# Patient Record
Sex: FEMALE | Race: WHITE | NOT HISPANIC OR LATINO | Employment: FULL TIME | URBAN - METROPOLITAN AREA
[De-identification: names, ages, dates, MRNs, and addresses within clinical notes are randomized per-mention and may not be internally consistent; named-entity substitution may affect disease eponyms.]

---

## 2017-03-19 ENCOUNTER — HOSPITAL ENCOUNTER (EMERGENCY)
Facility: HOSPITAL | Age: 37
Discharge: HOME | End: 2017-03-19
Attending: EMERGENCY MEDICINE
Payer: COMMERCIAL

## 2017-03-19 VITALS
SYSTOLIC BLOOD PRESSURE: 117 MMHG | DIASTOLIC BLOOD PRESSURE: 76 MMHG | WEIGHT: 112 LBS | OXYGEN SATURATION: 99 % | TEMPERATURE: 98 F | BODY MASS INDEX: 19.12 KG/M2 | HEART RATE: 65 BPM | HEIGHT: 64 IN | RESPIRATION RATE: 16 BRPM

## 2017-03-19 DIAGNOSIS — L24.9 IRRITANT CONTACT DERMATITIS, UNSPECIFIED TRIGGER: Primary | ICD-10-CM

## 2017-03-19 PROCEDURE — 99283 EMERGENCY DEPT VISIT LOW MDM: CPT

## 2017-03-19 RX ORDER — PREDNISONE 20 MG/1
40 TABLET ORAL DAILY
Qty: 10 TABLET | Refills: 0 | Status: SHIPPED | OUTPATIENT
Start: 2017-03-19 | End: 2017-03-24

## 2017-03-19 NOTE — ED AVS SNAPSHOT
[facility] [address] [place] [address]               Lexy Guerra   3/19/2017  9:28 PM   ED    Description:  Female : 1980   Department:  [facility]           Your Care was Coordinated By:     Provider Role From To    Odell Red MD Attending Provider 17 [phone] --      Reason for Visit     Insect Bite           Diagnoses this Visit        Comments    Irritant contact dermatitis, unspecified trigger    -  Primary       ED Disposition     ED Disposition Condition Comment    Discharge             To Do List           Follow-up Information     Follow up with Sirisha Moe MD.    Specialty:  Family Medicine    Why:  As needed    Contact information:    [address]  [address]  [facility] [address] [phone]          Follow up with [facility].    Specialty:  Emergency Medicine    Why:  If symptoms worsen    Contact information:    [address] [place] [id number] [phone]      [facility]     [facility] Care [facility] -  Assistance  Registered nurses in the [facility] provide clinical advisement, health education, appointment booking, and other advisory services.  Call for this free service at [phone].             Medications           Message regarding Medications     Verify the changes and/or additions to your medication regime listed below are the same as discussed with your clinician today.  If any of these changes or additions are incorrect, please notify your healthcare provider.             Verify that the below list of medications is an accurate representation of the medications you are currently taking.  If none reported, the list may be blank. If incorrect, please contact your healthcare provider. Carry this list with you in case of emergency.                Clinical Reference Information           Your Vitals Were     BP  "Pulse Temp Resp Height Weight    117/76 65 98.3 °F (36.8 °C) (Oral) 16 5' 3.5" (1.613 m) 50.8 kg (112 lb)    Last Period SpO2 BMI          03/19/2017 99% 19.53 kg/m2        Allergies as of 3/19/2017     No Known Allergies      Immunizations Administered on Date of Encounter - 3/19/2017     None      ED Micro, Lab, POCT     None      ED Imaging Orders     None        Discharge Instructions         Understanding Contact Dermatitis     A cool, moist compress can help reduce itching.     Contact dermatitis is a common type of skin rash. Its caused by something that touches the skin and makes it irritated and inflamed. It can occur on skin on any part of the body, such as the face, neck, hands, arms, and legs. Contact dermatitis is not spread from person to person.  Often, the reaction of contact dermatitis occurs 1 to 2 days after contact with the offending agent.  How to say it  CARLOS-tact [id number]   What causes contact dermatitis?  Its caused by something that irritates the skin, or that creates an allergic reaction on the skin. People can get contact dermatitis from many kinds of things. These include:  · Plant oils in poison ivy, oak, and sumac  · Chemicals in household , solvents, and glue  · Chemicals in makeup, soap, laundry detergent, perfume, acne cream, and hair products  · Certain medicines, such as neomycin, bacitracin, benzocaine, and thimerosal  · Metals such as nickel, found in some jewelry and watch bands   · The sticky material on the back of bandages and tape (adhesive)  · Things that can cause tiny breaks in the skin, such as wood, fiberglass, metal tools, and plant thorns  · Rubber latex in surgical gloves and other medical supplies  Dermatitis can also be caused by the skin being damp for long periods of time. This can happen from washing your hands too often, or working with wet materials.  Symptoms of contact dermatitis  Symptoms can include skin that " is:  · Blistered  · Burning  · Cracked  · Dry  · Itchy  · Painful  · Red  · Rough, thickened, and leathery  · Swollen  · Warm  The blisters may ooze fluid and form crusts.  Treatment for contact dermatitis  Treatment is done to help relieve itching and reduce inflammation. The rash should go away in a few days to a few weeks. Treatments include:  · Cool, moist compress. Use a clean damp cloth. Put it on the area for 20 to 30 minutes, 5 to 6 times a day for the first 3 days.  · Steroid cream or ointment. You can apply this medicine several times a day on clean skin.  · Oral corticosteroid. Your healthcare provider may prescribe this medicine if you have severe skin symptoms on a large part of your body.  Your healthcare provider may give you a steroid injection instead of pills.  · Oral antihistamine. This medicine can help reduce itching.  · Colloidal oatmeal bath. Soaking in water with colloidal oatmeal can help soothe skin.  · Plain cream, lotion, or ointment. Cream, lotion, or ointment without medicine can help to soothe and protect your skin.  Living with contact dermatitis  Talk with your healthcare provider about what may have caused your contact dermatitis. Patch testing may help you figure out what caused the rash so you can avoid further contact with it. Once you learn what caused your rash, make sure to avoid that substance. If your skin comes into contact with it again, make sure to wash your skin right away. If you cant avoid the substance, wear gloves or other protective clothing before you touch it. Or use a cream, lotion, or ointment to protect your skin.  When to call your healthcare provider  Call your healthcare provider right away if you have any of these:  · Fever of 100.4°F (38°C) or higher, or as directed  · Symptoms that dont get better, or get worse  · New symptoms   Date Last Reviewed: 5/1/2016  © 4313-2101 [employer]. [address] [place] [address], [place] [address]. All rights  reserved. This information is not intended as a substitute for professional medical care. Always follow your healthcare professional's instructions.          [facility] Sign-Up     Activating your [facility] account is as easy as 1-2-3!     1) Visit [email or web address], select Sign Up Now, enter this activation code and your date of birth, then select Next.  [email or web address]  Expires: 5/3/2017 10:57 PM      2) Create a username and password to use when you visit [facility] in the future and select a security question in case you lose your password and select Next.    3) Enter your e-mail address and click Sign Up!    Additional Information  If you have questions, please e-mail [email or web address] or call [phone] to talk to our [facility] staff. Remember, [facility] is NOT to be used for urgent needs. For medical emergencies, dial 911.          [facility] complies with applicable Federal civil rights laws and does not discriminate on the basis of race, color, national origin, age, disability, or sex.        Language Assistance Services     ATTENTION: Language assistance services are available, free of charge. Please call [phone].      ATENCIÓN: Si habla ryan, tiene a pak disposición servicios gratuitos de asistencia lingüística. Llame al [phone].     CHÚ Ý: N?u b?n nói Ti?ng Vi?t, có các d?ch v? h? tr? ngôn ng? mi?n phí dành cho b?n. G?i s? [phone].

## 2017-03-20 NOTE — ED PROVIDER NOTES
Encounter Date: 3/19/2017    SCRIBE #1 NOTE: I, Jasmyn Wagner, am scribing for, and in the presence of, Dr Red.       History     Chief Complaint   Patient presents with    Insect Bite     About 11:00 this morning      Review of patient's allergies indicates:  No Known Allergies  HPI Comments: 03/19/2017  10:54 PM     Chief Complaint: Insect Bite      Lexy Guerra is a 36 y.o. female presenting to the E.D. with an acute onset of swelling to her left ring finger which occurred suddenly tonight. Pt notes possible spider bite to finger but denies pain or itching to site. She notes she was in woods earlier today helping family member cutting mando. She has taken Benadryl tonight which mildly alleviated symptoms. Denies SOB, other lesions. No past medical history on file.  Pt has no past surgical history on file.    The history is provided by the patient.     History reviewed. No pertinent past medical history.  History reviewed. No pertinent surgical history.  History reviewed. No pertinent family history.  Social History   Substance Use Topics    Smoking status: Never Smoker    Smokeless tobacco: None    Alcohol use None     Review of Systems   Constitutional: Negative for fever.   HENT: Negative for sore throat.    Eyes: Negative for visual disturbance.   Respiratory: Negative for cough.    Cardiovascular: Negative for chest pain.   Gastrointestinal: Negative for abdominal pain, diarrhea, nausea and vomiting.   Genitourinary: Negative for difficulty urinating and pelvic pain.   Musculoskeletal: Positive for joint swelling. Negative for arthralgias.        Left 4th finger swelling.   Skin: Negative for rash.   Neurological: Negative for weakness.       Physical Exam   Initial Vitals   BP Pulse Resp Temp SpO2   03/19/17 2122 03/19/17 2122 03/19/17 2122 03/19/17 2122 03/19/17 2122   117/76 65 16 98.3 °F (36.8 °C) 99 %     Physical Exam    Nursing note and vitals reviewed.  Constitutional: She appears  well-developed.   HENT:   Head: Normocephalic and atraumatic.   Mouth/Throat: Oropharynx is clear and moist.   Eyes: Conjunctivae are normal.   Neck: Neck supple.   Cardiovascular: Normal rate, regular rhythm, normal heart sounds and intact distal pulses. Exam reveals no gallop and no friction rub.    No murmur heard.  Pulmonary/Chest: Breath sounds normal. She has no wheezes. She has no rhonchi. She has no rales.   Abdominal: Soft. She exhibits no distension. There is no tenderness.   Musculoskeletal: Normal range of motion.   Neurological: She is alert and oriented to person, place, and time.   Skin: No rash noted. There is erythema.   Focal erythema over the middle portion of left ring finger. No warmth.    Psychiatric: She has a normal mood and affect.         ED Course   Procedures  Labs Reviewed - No data to display          Medical Decision Making:   History:   Old Medical Records: I decided to obtain old medical records.  Initial Assessment:   36-year-old woman presents for evaluation of rash on her finger which is painless.  Patient was exposed to various substances in nature, unsure as to what the insulting agent was.  She did notice some swelling of her ring finger and removed her ring and had a mildly erythematous rash.  No discharge or pustule formation.  No fever or mucous membrane involvement or any other symptoms.  Her symptoms have improved after taking Benadryl.  Suspect contact dermatitis.  We'll give her steroids and she is to continue taking Benadryl.  Return precautions discussed.  She is discharged improved in no acute distress.            Scribe Attestation:   Scribe #1: I performed the above scribed service and the documentation accurately describes the services I performed. I attest to the accuracy of the note.    Attending Attestation:           Physician Attestation for Scribe:  Physician Attestation Statement for Scribe #1: I, Dr Red, reviewed documentation, as scribed by Jasmyn Wagner  in my presence, and it is both accurate and complete.                 ED Course     Clinical Impression:   The encounter diagnosis was Irritant contact dermatitis, unspecified trigger.          Odell Red MD  03/20/17 0044

## 2017-03-20 NOTE — DISCHARGE INSTRUCTIONS
Understanding Contact Dermatitis     A cool, moist compress can help reduce itching.     Contact dermatitis is a common type of skin rash. Its caused by something that touches the skin and makes it irritated and inflamed. It can occur on skin on any part of the body, such as the face, neck, hands, arms, and legs. Contact dermatitis is not spread from person to person.  Often, the reaction of contact dermatitis occurs 1 to 2 days after contact with the offending agent.  How to say it  CARLOS-tact [id number]   What causes contact dermatitis?  Its caused by something that irritates the skin, or that creates an allergic reaction on the skin. People can get contact dermatitis from many kinds of things. These include:  · Plant oils in poison ivy, oak, and sumac  · Chemicals in household , solvents, and glue  · Chemicals in makeup, soap, laundry detergent, perfume, acne cream, and hair products  · Certain medicines, such as neomycin, bacitracin, benzocaine, and thimerosal  · Metals such as nickel, found in some jewelry and watch bands   · The sticky material on the back of bandages and tape (adhesive)  · Things that can cause tiny breaks in the skin, such as wood, fiberglass, metal tools, and plant thorns  · Rubber latex in surgical gloves and other medical supplies  Dermatitis can also be caused by the skin being damp for long periods of time. This can happen from washing your hands too often, or working with wet materials.  Symptoms of contact dermatitis  Symptoms can include skin that is:  · Blistered  · Burning  · Cracked  · Dry  · Itchy  · Painful  · Red  · Rough, thickened, and leathery  · Swollen  · Warm  The blisters may ooze fluid and form crusts.  Treatment for contact dermatitis  Treatment is done to help relieve itching and reduce inflammation. The rash should go away in a few days to a few weeks. Treatments include:  · Cool, moist compress. Use a clean damp cloth. Put it on the area for 20 to 30  minutes, 5 to 6 times a day for the first 3 days.  · Steroid cream or ointment. You can apply this medicine several times a day on clean skin.  · Oral corticosteroid. Your healthcare provider may prescribe this medicine if you have severe skin symptoms on a large part of your body.  Your healthcare provider may give you a steroid injection instead of pills.  · Oral antihistamine. This medicine can help reduce itching.  · Colloidal oatmeal bath. Soaking in water with colloidal oatmeal can help soothe skin.  · Plain cream, lotion, or ointment. Cream, lotion, or ointment without medicine can help to soothe and protect your skin.  Living with contact dermatitis  Talk with your healthcare provider about what may have caused your contact dermatitis. Patch testing may help you figure out what caused the rash so you can avoid further contact with it. Once you learn what caused your rash, make sure to avoid that substance. If your skin comes into contact with it again, make sure to wash your skin right away. If you cant avoid the substance, wear gloves or other protective clothing before you touch it. Or use a cream, lotion, or ointment to protect your skin.  When to call your healthcare provider  Call your healthcare provider right away if you have any of these:  · Fever of 100.4°F (38°C) or higher, or as directed  · Symptoms that dont get better, or get worse  · New symptoms   Date Last Reviewed: 5/1/2016  © 9161-3232 [employer]. [address] [place] [address], [place] [address]. All rights reserved. This information is not intended as a substitute for professional medical care. Always follow your healthcare professional's instructions.

## 2018-02-02 ENCOUNTER — OFFICE VISIT (OUTPATIENT)
Dept: OBSTETRICS AND GYNECOLOGY | Facility: CLINIC | Age: 38
End: 2018-02-02
Payer: COMMERCIAL

## 2018-02-02 VITALS
HEIGHT: 64 IN | DIASTOLIC BLOOD PRESSURE: 64 MMHG | WEIGHT: 108.25 LBS | SYSTOLIC BLOOD PRESSURE: 94 MMHG | BODY MASS INDEX: 18.48 KG/M2 | HEART RATE: 85 BPM

## 2018-02-02 DIAGNOSIS — Z01.419 ENCOUNTER FOR WELL WOMAN EXAM WITH ROUTINE GYNECOLOGICAL EXAM: Primary | ICD-10-CM

## 2018-02-02 PROCEDURE — 88175 CYTOPATH C/V AUTO FLUID REDO: CPT

## 2018-02-02 PROCEDURE — 99385 PREV VISIT NEW AGE 18-39: CPT | Mod: S$GLB,,, | Performed by: NURSE PRACTITIONER

## 2018-02-02 PROCEDURE — 99999 PR PBB SHADOW E&M-EST. PATIENT-LVL III: CPT | Mod: PBBFAC,,, | Performed by: NURSE PRACTITIONER

## 2018-02-02 NOTE — PROGRESS NOTES
Chief Complaint   Patient presents with    Well Woman       History of Present Illness: Lexy Guerra is a 37 y.o. female that presents today 2018 for well gyn visit.  Pt here for well woman exam. Pt denies any abnormal pap smears in the past. She reports that she has once monthly cycles with no complaints. She is not on birth control and does not desire to be on any. She also does not desire any STD testing. No other complaints or concerns noted.          History reviewed. No pertinent past medical history.    History reviewed. No pertinent surgical history.    Family History   Problem Relation Age of Onset    Colon polyps Father     Diabetes Neg Hx     Hypertension Neg Hx     Breast cancer Neg Hx     Cancer Neg Hx     Colon cancer Neg Hx     Ovarian cancer Neg Hx        Social History     Social History    Marital status:      Spouse name: N/A    Number of children: N/A    Years of education: N/A     Social History Main Topics    Smoking status: Never Smoker    Smokeless tobacco: Never Used    Alcohol use No    Drug use: No    Sexual activity: Yes     Partners: Male     Birth control/ protection: None, Partner-Vasectomy     Other Topics Concern    None     Social History Narrative    None       OB History    Para Term  AB Living   3 0 0 0 3 0   SAB TAB Ectopic Multiple Live Births   3 0 0 0 0      # Outcome Date GA Lbr Wilver/2nd Weight Sex Delivery Anes PTL Lv   3 SAB            2 SAB            1 SAB                   No current outpatient prescriptions on file.     No current facility-administered medications for this visit.        Review of patient's allergies indicates:  No Known Allergies    Review of Symptoms:  GENERAL: Denies weight gain or weight loss. Feeling well overall.   SKIN: Denies rash or lesions.   ABDOMEN: No abdominal pain, constipation, diarrhea, nausea, vomiting or rectal bleeding.   URINARY: No frequency, dysuria, hematuria, or burning on  "urination.    BP 94/64   Pulse 85   Ht 5' 3.5" (1.613 m)   Wt 49.1 kg (108 lb 3.9 oz)   LMP 01/22/2018 (Approximate)     Physical Exam:  APPEARANCE: Well nourished, well developed, in no acute distress.  SKIN: Normal skin turgor, no lesions.  RESPIRATORY: Normal respiratory effort with no retractions or use of accessory muscles  ABDOMEN: Soft. No tenderness or masses. No hepatosplenomegaly. No hernias.  BREASTS: Symmetrical, no skin changes or visible lesions. No palpable masses, nipple discharge or adenopathy bilaterally.  PELVIC: Normal external female genitalia without lesions. Normal hair distribution. Adequate perineal body, normal urethral meatus. Urethra with no masses.  Bladder nontender. Vagina moist and well rugated without lesions or discharge. Cervix pink and without lesions. No significant cystocele or rectocele. Bimanual exam showed uterus normal size, shape, position, mobile and nontender. Adnexa without masses or tenderness. Urethra and bladder normal. PAP DONE      ASSESSMENT/PLAN:  Encounter for well woman exam with routine gynecological exam  -     Liquid-based pap smear, screening          Patient was counseled today on Pap guidelines, recommendation for pelvic exams, mammograms starting annually at age 40, Colonoscopy after the age of 50, Dexa Bone Scan and calcium and vitamin D supplementation in menopause and to see her PCP for other health maintenance.       Follow-up:  RTC in 1 year for well woman exam or as needed  "

## 2018-10-08 ENCOUNTER — HOSPITAL ENCOUNTER (OUTPATIENT)
Dept: RADIOLOGY | Facility: HOSPITAL | Age: 38
Discharge: HOME | End: 2018-10-08
Attending: OTOLARYNGOLOGY
Payer: COMMERCIAL

## 2018-10-08 DIAGNOSIS — J32.4 CHRONIC PANSINUSITIS: ICD-10-CM

## 2018-10-08 DIAGNOSIS — J32.4 CHRONIC PANSINUSITIS: Primary | ICD-10-CM

## 2018-10-08 PROCEDURE — 70486 CT MAXILLOFACIAL W/O DYE: CPT | Mod: TC

## 2018-10-08 PROCEDURE — 70486 CT MAXILLOFACIAL W/O DYE: CPT | Mod: 26,,, | Performed by: RADIOLOGY

## 2018-10-09 ENCOUNTER — LAB VISIT (OUTPATIENT)
Dept: LAB | Facility: HOSPITAL | Age: 38
End: 2018-10-09
Attending: OTOLARYNGOLOGY
Payer: COMMERCIAL

## 2018-10-09 DIAGNOSIS — J32.8 CHRONIC FRONTOETHMOIDAL SINUSITIS: Primary | ICD-10-CM

## 2018-10-09 PROCEDURE — 87107 FUNGI IDENTIFICATION MOLD: CPT

## 2018-10-09 PROCEDURE — 87070 CULTURE OTHR SPECIMN AEROBIC: CPT

## 2018-10-09 PROCEDURE — 87102 FUNGUS ISOLATION CULTURE: CPT

## 2018-10-12 LAB — BACTERIA NPH AEROBE CULT: NORMAL

## 2018-10-17 ENCOUNTER — LAB VISIT (OUTPATIENT)
Dept: LAB | Facility: HOSPITAL | Age: 38
End: 2018-10-17
Attending: OTOLARYNGOLOGY
Payer: COMMERCIAL

## 2018-10-17 DIAGNOSIS — J32.0 CHRONIC MAXILLARY SINUSITIS: Primary | ICD-10-CM

## 2018-10-17 DIAGNOSIS — J32.2 CHRONIC ETHMOIDAL SINUSITIS: ICD-10-CM

## 2018-10-17 LAB
B-HCG UR QL: NEGATIVE
ERYTHROCYTE [DISTWIDTH] IN BLOOD BY AUTOMATED COUNT: 13 %
HCT VFR BLD AUTO: 38.9 %
HGB BLD-MCNC: 12.7 G/DL
MCH RBC QN AUTO: 30.1 PG
MCHC RBC AUTO-ENTMCNC: 32.6 G/DL
MCV RBC AUTO: 92 FL
PLATELET # BLD AUTO: 271 K/UL
PMV BLD AUTO: 9.5 FL
RBC # BLD AUTO: 4.22 M/UL
WBC # BLD AUTO: 6.46 K/UL

## 2018-10-17 PROCEDURE — 36415 COLL VENOUS BLD VENIPUNCTURE: CPT

## 2018-10-17 PROCEDURE — 85027 COMPLETE CBC AUTOMATED: CPT

## 2018-10-17 PROCEDURE — 81025 URINE PREGNANCY TEST: CPT

## 2018-10-18 ENCOUNTER — ANESTHESIA EVENT (OUTPATIENT)
Dept: SURGERY | Facility: HOSPITAL | Age: 38
End: 2018-10-18
Payer: COMMERCIAL

## 2018-10-18 ENCOUNTER — HOSPITAL ENCOUNTER (OUTPATIENT)
Facility: HOSPITAL | Age: 38
Discharge: HOME | End: 2018-10-18
Attending: OTOLARYNGOLOGY | Admitting: OTOLARYNGOLOGY
Payer: COMMERCIAL

## 2018-10-18 ENCOUNTER — ANESTHESIA (OUTPATIENT)
Dept: SURGERY | Facility: HOSPITAL | Age: 38
End: 2018-10-18
Payer: COMMERCIAL

## 2018-10-18 VITALS
TEMPERATURE: 98 F | SYSTOLIC BLOOD PRESSURE: 123 MMHG | HEART RATE: 62 BPM | DIASTOLIC BLOOD PRESSURE: 87 MMHG | WEIGHT: 108 LBS | OXYGEN SATURATION: 100 % | RESPIRATION RATE: 20 BRPM | BODY MASS INDEX: 19.14 KG/M2 | HEIGHT: 63 IN

## 2018-10-18 DIAGNOSIS — J32.0 ANTRITIS CHRONIC: ICD-10-CM

## 2018-10-18 PROCEDURE — 25000003 PHARM REV CODE 250: Performed by: ANESTHESIOLOGY

## 2018-10-18 PROCEDURE — D9220A PRA ANESTHESIA: Mod: CRNA,,, | Performed by: NURSE ANESTHETIST, CERTIFIED REGISTERED

## 2018-10-18 PROCEDURE — 88312 SPECIAL STAINS GROUP 1: CPT | Performed by: PATHOLOGY

## 2018-10-18 PROCEDURE — 71000015 HC POSTOP RECOV 1ST HR: Performed by: OTOLARYNGOLOGY

## 2018-10-18 PROCEDURE — 63600175 PHARM REV CODE 636 W HCPCS: Performed by: OTOLARYNGOLOGY

## 2018-10-18 PROCEDURE — 88305 TISSUE EXAM BY PATHOLOGIST: CPT | Mod: 26,,, | Performed by: PATHOLOGY

## 2018-10-18 PROCEDURE — 88305 TISSUE EXAM BY PATHOLOGIST: CPT | Performed by: PATHOLOGY

## 2018-10-18 PROCEDURE — 71000033 HC RECOVERY, INTIAL HOUR: Performed by: OTOLARYNGOLOGY

## 2018-10-18 PROCEDURE — 37000009 HC ANESTHESIA EA ADD 15 MINS: Performed by: OTOLARYNGOLOGY

## 2018-10-18 PROCEDURE — 63600175 PHARM REV CODE 636 W HCPCS: Performed by: ANESTHESIOLOGY

## 2018-10-18 PROCEDURE — 25000003 PHARM REV CODE 250: Performed by: OTOLARYNGOLOGY

## 2018-10-18 PROCEDURE — 25000003 PHARM REV CODE 250: Performed by: NURSE ANESTHETIST, CERTIFIED REGISTERED

## 2018-10-18 PROCEDURE — C1726 CATH, BAL DIL, NON-VASCULAR: HCPCS | Performed by: OTOLARYNGOLOGY

## 2018-10-18 PROCEDURE — 63600175 PHARM REV CODE 636 W HCPCS

## 2018-10-18 PROCEDURE — 36000708 HC OR TIME LEV III 1ST 15 MIN: Performed by: OTOLARYNGOLOGY

## 2018-10-18 PROCEDURE — 37000008 HC ANESTHESIA 1ST 15 MINUTES: Performed by: OTOLARYNGOLOGY

## 2018-10-18 PROCEDURE — 36000709 HC OR TIME LEV III EA ADD 15 MIN: Performed by: OTOLARYNGOLOGY

## 2018-10-18 PROCEDURE — 27201423 OPTIME MED/SURG SUP & DEVICES STERILE SUPPLY: Performed by: OTOLARYNGOLOGY

## 2018-10-18 PROCEDURE — D9220A PRA ANESTHESIA: Mod: ANES,,, | Performed by: ANESTHESIOLOGY

## 2018-10-18 PROCEDURE — 87070 CULTURE OTHR SPECIMN AEROBIC: CPT

## 2018-10-18 PROCEDURE — 63600175 PHARM REV CODE 636 W HCPCS: Performed by: NURSE ANESTHETIST, CERTIFIED REGISTERED

## 2018-10-18 PROCEDURE — 71000039 HC RECOVERY, EACH ADD'L HOUR: Performed by: OTOLARYNGOLOGY

## 2018-10-18 PROCEDURE — 88312 SPECIAL STAINS GROUP 1: CPT | Mod: 26,,, | Performed by: PATHOLOGY

## 2018-10-18 PROCEDURE — 25000242 PHARM REV CODE 250 ALT 637 W/ HCPCS: Performed by: OTOLARYNGOLOGY

## 2018-10-18 RX ORDER — BUDESONIDE 0.25 MG/2ML
INHALANT ORAL
Status: DISCONTINUED | OUTPATIENT
Start: 2018-10-18 | End: 2018-10-18 | Stop reason: HOSPADM

## 2018-10-18 RX ORDER — PROPOFOL 10 MG/ML
VIAL (ML) INTRAVENOUS
Status: DISCONTINUED | OUTPATIENT
Start: 2018-10-18 | End: 2018-10-18

## 2018-10-18 RX ORDER — SODIUM CHLORIDE, SODIUM LACTATE, POTASSIUM CHLORIDE, CALCIUM CHLORIDE 600; 310; 30; 20 MG/100ML; MG/100ML; MG/100ML; MG/100ML
125 INJECTION, SOLUTION INTRAVENOUS CONTINUOUS
Status: DISCONTINUED | OUTPATIENT
Start: 2018-10-18 | End: 2018-10-18 | Stop reason: HOSPADM

## 2018-10-18 RX ORDER — MORPHINE SULFATE 4 MG/ML
INJECTION, SOLUTION INTRAMUSCULAR; INTRAVENOUS
Status: DISCONTINUED
Start: 2018-10-18 | End: 2018-10-18 | Stop reason: HOSPADM

## 2018-10-18 RX ORDER — OXYMETAZOLINE HCL 0.05 %
SPRAY, NON-AEROSOL (ML) NASAL
Status: DISCONTINUED | OUTPATIENT
Start: 2018-10-18 | End: 2018-10-18 | Stop reason: HOSPADM

## 2018-10-18 RX ORDER — OXYMETAZOLINE HCL 0.05 %
3 SPRAY, NON-AEROSOL (ML) NASAL
Status: COMPLETED | OUTPATIENT
Start: 2018-10-18 | End: 2018-10-18

## 2018-10-18 RX ORDER — ONDANSETRON 2 MG/ML
INJECTION INTRAMUSCULAR; INTRAVENOUS
Status: DISCONTINUED | OUTPATIENT
Start: 2018-10-18 | End: 2018-10-18

## 2018-10-18 RX ORDER — SODIUM CHLORIDE, SODIUM LACTATE, POTASSIUM CHLORIDE, CALCIUM CHLORIDE 600; 310; 30; 20 MG/100ML; MG/100ML; MG/100ML; MG/100ML
INJECTION, SOLUTION INTRAVENOUS CONTINUOUS
Status: DISCONTINUED | OUTPATIENT
Start: 2018-10-18 | End: 2018-10-18 | Stop reason: HOSPADM

## 2018-10-18 RX ORDER — MEPERIDINE HYDROCHLORIDE 50 MG/ML
INJECTION INTRAMUSCULAR; INTRAVENOUS; SUBCUTANEOUS
Status: DISCONTINUED | OUTPATIENT
Start: 2018-10-18 | End: 2018-10-18

## 2018-10-18 RX ORDER — FLUCONAZOLE 100 MG/1
TABLET ORAL
Status: DISCONTINUED | OUTPATIENT
Start: 2018-10-18 | End: 2018-10-18 | Stop reason: HOSPADM

## 2018-10-18 RX ORDER — GLYCOPYRROLATE 0.2 MG/ML
INJECTION INTRAMUSCULAR; INTRAVENOUS
Status: DISCONTINUED | OUTPATIENT
Start: 2018-10-18 | End: 2018-10-18

## 2018-10-18 RX ORDER — LIDOCAINE HYDROCHLORIDE AND EPINEPHRINE 10; 10 MG/ML; UG/ML
INJECTION, SOLUTION INFILTRATION; PERINEURAL
Status: DISCONTINUED | OUTPATIENT
Start: 2018-10-18 | End: 2018-10-18 | Stop reason: HOSPADM

## 2018-10-18 RX ORDER — ONDANSETRON 2 MG/ML
4 INJECTION INTRAMUSCULAR; INTRAVENOUS DAILY PRN
Status: DISCONTINUED | OUTPATIENT
Start: 2018-10-18 | End: 2018-10-18 | Stop reason: HOSPADM

## 2018-10-18 RX ORDER — SUCCINYLCHOLINE CHLORIDE 20 MG/ML
INJECTION INTRAMUSCULAR; INTRAVENOUS
Status: DISCONTINUED | OUTPATIENT
Start: 2018-10-18 | End: 2018-10-18

## 2018-10-18 RX ORDER — DEXAMETHASONE SODIUM PHOSPHATE 4 MG/ML
INJECTION, SOLUTION INTRA-ARTICULAR; INTRALESIONAL; INTRAMUSCULAR; INTRAVENOUS; SOFT TISSUE
Status: DISCONTINUED | OUTPATIENT
Start: 2018-10-18 | End: 2018-10-18

## 2018-10-18 RX ORDER — CEFAZOLIN SODIUM 1 G/3ML
INJECTION, POWDER, FOR SOLUTION INTRAMUSCULAR; INTRAVENOUS
Status: DISCONTINUED | OUTPATIENT
Start: 2018-10-18 | End: 2018-10-18

## 2018-10-18 RX ORDER — ONDANSETRON 2 MG/ML
INJECTION INTRAMUSCULAR; INTRAVENOUS
Status: COMPLETED
Start: 2018-10-18 | End: 2018-10-18

## 2018-10-18 RX ORDER — MIDAZOLAM HYDROCHLORIDE 1 MG/ML
INJECTION, SOLUTION INTRAMUSCULAR; INTRAVENOUS
Status: DISCONTINUED | OUTPATIENT
Start: 2018-10-18 | End: 2018-10-18

## 2018-10-18 RX ORDER — MORPHINE SULFATE 2 MG/ML
2 INJECTION, SOLUTION INTRAMUSCULAR; INTRAVENOUS EVERY 5 MIN PRN
Status: DISCONTINUED | OUTPATIENT
Start: 2018-10-18 | End: 2018-10-18 | Stop reason: HOSPADM

## 2018-10-18 RX ORDER — LIDOCAINE HYDROCHLORIDE 10 MG/ML
1 INJECTION, SOLUTION EPIDURAL; INFILTRATION; INTRACAUDAL; PERINEURAL ONCE
Status: DISCONTINUED | OUTPATIENT
Start: 2018-10-18 | End: 2018-10-18 | Stop reason: HOSPADM

## 2018-10-18 RX ORDER — CEFAZOLIN SODIUM 1 G/3ML
INJECTION, POWDER, FOR SOLUTION INTRAMUSCULAR; INTRAVENOUS
Status: COMPLETED
Start: 2018-10-18 | End: 2018-10-18

## 2018-10-18 RX ADMIN — SODIUM CHLORIDE, POTASSIUM CHLORIDE, SODIUM LACTATE AND CALCIUM CHLORIDE: 600; 310; 30; 20 INJECTION, SOLUTION INTRAVENOUS at 12:10

## 2018-10-18 RX ADMIN — MORPHINE SULFATE 2 MG: 2 INJECTION, SOLUTION INTRAMUSCULAR; INTRAVENOUS at 02:10

## 2018-10-18 RX ADMIN — ONDANSETRON 4 MG: 2 INJECTION INTRAMUSCULAR; INTRAVENOUS at 01:10

## 2018-10-18 RX ADMIN — ONDANSETRON HYDROCHLORIDE 4 MG: 2 INJECTION INTRAMUSCULAR; INTRAVENOUS at 02:10

## 2018-10-18 RX ADMIN — MEPERIDINE HYDROCHLORIDE 25 MG: 50 INJECTION INTRAMUSCULAR; INTRAVENOUS; SUBCUTANEOUS at 01:10

## 2018-10-18 RX ADMIN — OXYMETAZOLINE HYDROCHLORIDE 3 SPRAY: 5 SPRAY NASAL at 11:10

## 2018-10-18 RX ADMIN — ONDANSETRON 4 MG: 2 INJECTION INTRAMUSCULAR; INTRAVENOUS at 02:10

## 2018-10-18 RX ADMIN — MEPERIDINE HYDROCHLORIDE 25 MG: 50 INJECTION INTRAMUSCULAR; INTRAVENOUS; SUBCUTANEOUS at 12:10

## 2018-10-18 RX ADMIN — DEXAMETHASONE SODIUM PHOSPHATE 12 MG: 4 INJECTION, SOLUTION INTRAMUSCULAR; INTRAVENOUS at 12:10

## 2018-10-18 RX ADMIN — GLYCOPYRROLATE 0.4 MG: 0.2 INJECTION INTRAMUSCULAR; INTRAVENOUS at 12:10

## 2018-10-18 RX ADMIN — MIDAZOLAM HYDROCHLORIDE 2 MG: 1 INJECTION, SOLUTION INTRAMUSCULAR; INTRAVENOUS at 12:10

## 2018-10-18 RX ADMIN — CEFAZOLIN 1 G: 330 INJECTION, POWDER, FOR SOLUTION INTRAMUSCULAR; INTRAVENOUS at 12:10

## 2018-10-18 RX ADMIN — SUCCINYLCHOLINE CHLORIDE 100 MG: 20 INJECTION, SOLUTION INTRAMUSCULAR; INTRAVENOUS at 12:10

## 2018-10-18 RX ADMIN — PROPOFOL 130 MG: 10 INJECTION, EMULSION INTRAVENOUS at 12:10

## 2018-10-18 NOTE — DISCHARGE SUMMARY
[facility] - Periop Services    Discharge Note        SUMMARY     Admit Date: 10/18/2018    Attending Physician: Torin Carver MD     Discharge Physician: Torin Carver MD    Discharge Date: 10/18/2018 2:03 PM      Hospital Course: Patient tolerated procedure well.     Disposition: Home or Self Care    Patient Instructions:   There are no discharge medications for this patient.      Discharge Procedure Orders (must include Diet, Follow-up, Activity):  No discharge procedures on file.     Follow Up:  Follow up as scheduled.  Resume routine diet.  Activity as tolerated.

## 2018-10-18 NOTE — ANESTHESIA PREPROCEDURE EVALUATION
10/18/2018  Lexy Guerra is a 38 y.o., female.    Pre-op Assessment    I have reviewed the Patient Summary Reports.    I have reviewed the Nursing Notes.   I have reviewed the Medications.     Review of Systems  Anesthesia Hx:  No previous Anesthesia  Neg history of prior surgery. Denies Family Hx of Anesthesia complications.   Denies Personal Hx of Anesthesia complications.   Social:  Non-Smoker    Hematology/Oncology:  Hematology Normal   Oncology Normal     EENT/Dental:EENT/Dental Normal   Cardiovascular:  Cardiovascular Normal     Pulmonary:  Pulmonary Normal    Renal/:  Renal/ Normal     Hepatic/GI:  Hepatic/GI Normal    Musculoskeletal:  Musculoskeletal Normal    Neurological:  Neurology Normal    Endocrine:  Endocrine Normal    Dermatological:  Skin Normal    Psych:  Psychiatric Normal           Physical Exam  General:  Well nourished    Airway/Jaw/Neck:  AIRWAY FINDINGS: Normal      Eyes/Ears/Nose:  EYES/EARS/NOSE FINDINGS: Normal   Dental:  DENTAL FINDINGS: Normal   Chest/Lungs:  Chest/Lungs Clear    Heart/Vascular:  Heart Findings: Normal Heart murmur: negative Vascular Findings: Normal    Abdomen:  Abdomen Findings: Normal    Musculoskeletal:  Musculoskeletal Findings: Normal   Skin:  Skin Findings: Normal         Anesthesia Plan  Type of Anesthesia, risks & benefits discussed:  Anesthesia Type:  general  Patient's Preference:   Intra-op Monitoring Plan: standard ASA monitors  Intra-op Monitoring Plan Comments:   Post Op Pain Control Plan:   Post Op Pain Control Plan Comments:   Induction:   IV  Beta Blocker:  Patient is not currently on a Beta-Blocker (No further documentation required).       Informed Consent: Patient understands risks and agrees with Anesthesia plan.  Questions answered. Anesthesia consent signed with patient.  ASA Score: 1     Day of Surgery Review of History &  Physical:    H&P update referred to the provider.

## 2018-10-18 NOTE — OP NOTE
DATE OF PROCEDURE:  10/18/2018.    ANESTHESIA:  General endotracheal.    SURGEON:  Torin Carver Jr, MD.    PREOPERATIVE DIAGNOSES:  1. Chronic fungal sinusitis.  2. Bilateral inferior turbinate hypertrophy.  3. Nasal airway obstruction secondary to #2.     POSTOPERATIVE DIAGNOSES:  1. Chronic fungal sinusitis.  2. Bilateral inferior turbinate hypertrophy.  3. Nasal airway obstruction secondary to #2.     PROCEDURES PERFORMED:  1. Bilateral endoscopic anterior and posterior ethmoidectomy.  2. Bilateral endoscopic maxillary sinus antrostomy.  3. Bilateral endoscopic sphenoidotomy.  4. Bilateral endoscopic frontal duct exploration.  5. Bilateral incisional submucous resection of inferior turbinates.     The patient was taken to the operating room and placed in the supine  position. An IV was placed into the patient's arm. After satisfactory  general anesthesia had been obtained, the procedure was begun. Four  pledgets of 4 mL of 4% cocaine were placed into the patient's nose. The  patient's nose was injected with 1% lidocaine with 1:100,000 epinephrine;  approximately 15 mL was used. The patient was prepped and draped in the  standard fashion for a nasal operation.     Under endoscopic vision, the Sophie was walked down the lateral nasal wall  of the right nostril until it fell into the anterior ethmoid cells. The  anterior ethmoid cells were exenterated back to the lamina ethmoidalis. The  lamina ethmoidalis was breached, and the posterior ethmoid cells  exenterated back to the rostrum of the sphenoid. Under endoscopic vision  the sphenoid ostium was viewed and palpated and opened to approximately 5  mm in diameter. The 30 degree scope was taken and the maxillary sinus  viewed and palpated and the ostium opened to approximately 1 cm in  diameter. With a 70 degree scope, the nasofrontal area of the ethmoid  sinuses was viewed and, under endoscopic guidance, the nasofrontal duct was  opened so that it emptied widely  into the nose. This region was then packed  with 2 cocaine pledgets and attention turned to the left side.     Under endoscopic vision, the [place] was walked down the lateral nasal wall  of the left nostril until it fell into the anterior ethmoid cells. The  anterior ethmoid cells were exenterated back to the lamina ethmoidalis. The  lamina ethmoidalis was breached and the posterior ethmoid cells exenterated  back to the rostrum of the sphenoid. The 0 degree scope was taken and the  sphenoid ostium viewed and palpated. This was opened to approximately 5 mm  in diameter. The 30 degree scope was taken and the maxillary sinus ostium  viewed and palpated and opened to approximately 1 cm in diameter. With a 70  degree scope, the nasofrontal area of the ethmoid sinuses was viewed and,  under endoscopic guidance, the nasofrontal duct was opened so that it  emptied widely into the nose. The left airway was then packed with cocaine  pledgets.     Next, the inferior turbinates bilaterally were injected with lidocaine 1%  with epinephrine 1:100,000; approximately 10 mL was used. Then 4 mL of 4%  cocaine was placed on 4 pledgets and placed into the patient's nose. The  cocaine pledgets were removed and the procedure was begun. Attention was  turned to the right inferior turbinate. An incision was made along the  inferior aspect of the right inferior turbinate from posterior to anterior.  A medial flap was elevated with the Sophie. The turbinate bone and lateral  turbinate mucosa were excised over the anterior one half of the turbinate  and the medial flap laid back over the operative bed. Attention was then  turned to the left inferior turbinate. An incision was made from posterior  to anterior along the inferior surface of the inferior turbinate. A medial  flap was elevated. The turbinate bone and the lateral turbinate mucosa were  removed over the anterior one half of the inferior turbinate and the medial  flap laid back over  the operative bed.        ANUSHA/KAT dd: 10/18/2018 14:06:38 (CDT)   td: 10/18/2018 14:32:45 (CDT)  Doc ID #[id number]   Job ID #[id number]    CC:

## 2018-10-18 NOTE — PLAN OF CARE
Patient awake and drowsy. VSS. Minimal bleeding from nose. Pledgets in place. Dr Carver has instructed  to pull them tomorrow.

## 2018-10-18 NOTE — TRANSFER OF CARE
"Anesthesia Transfer of Care Note    Patient: Lexy Guerra    Procedure(s) Performed: Procedure(s) (LRB):  SEPTOPLASTY, NOSE (N/A)  REDUCTION, NASAL TURBINATE (Bilateral)  FESS (FUNCTIONAL ENDOSCOPIC SINUS SURGERY) (N/A)    Patient location: PACU    Anesthesia Type: general    Transport from OR: Transported from OR on room air with adequate spontaneous ventilation    Post pain: adequate analgesia    Post assessment: no apparent anesthetic complications and tolerated procedure well    Post vital signs: stable    Level of consciousness: awake and responds to stimulation    Nausea/Vomiting: no nausea/vomiting    Complications: none    Transfer of care protocol was followed      Last vitals:   Visit Vitals  /76   Pulse 72   Temp 36.5 °C (97.7 °F) (Axillary)   Resp 20   Ht 5' 3" (1.6 m)   Wt 49 kg (108 lb)   SpO2 98%   Breastfeeding? No   BMI 19.13 kg/m²     "

## 2018-10-18 NOTE — BRIEF OP NOTE
[facility] - Periop Services  Brief Operative Note     SUMMARY     Surgery Date: 10/18/2018     Surgeon(s) and Role:     * Torin Carver MD - Primary        Pre-op Diagnosis:  Airway obstruction [J98.8]  Hypertrophy of nasal turbinates [J34.3]  Ethmoid sinusitis [J32.2]  Chronic sphenoidal sinusitis [J32.3]  Chronic frontal sinusitis [J32.1]  Chronic maxillary sinusitis [J32.0]    Post-op Diagnosis:  Post-Op Diagnosis Codes:     * Airway obstruction [J98.8]     * Hypertrophy of nasal turbinates [J34.3]     * Ethmoid sinusitis [J32.2]     * Chronic sphenoidal sinusitis [J32.3]     * Chronic frontal sinusitis [J32.1]     * Chronic maxillary sinusitis [J32.0]    Procedure(s) (LRB):  SEPTOPLASTY, NOSE (N/A)  REDUCTION, NASAL TURBINATE (Bilateral)  FESS (FUNCTIONAL ENDOSCOPIC SINUS SURGERY) (N/A)  Septoplasty not done    Description of the findings of the procedure:  Airway obstruction [J98.8]  Hypertrophy of nasal turbinates [J34.3]  Ethmoid sinusitis [J32.2]  Chronic sphenoidal sinusitis [J32.3]  Chronic frontal sinusitis [J32.1]  Chronic maxillary sinusitis [J32.0]      Estimated Blood Loss: 100 mL

## 2018-10-18 NOTE — ANESTHESIA POSTPROCEDURE EVALUATION
"Anesthesia Post Evaluation    Patient: Lexy Guerra    Procedure(s) Performed: Procedure(s) (LRB):  SEPTOPLASTY, NOSE (N/A)  REDUCTION, NASAL TURBINATE (Bilateral)  FESS (FUNCTIONAL ENDOSCOPIC SINUS SURGERY) (N/A)    Final Anesthesia Type: general  Patient location during evaluation: PACU  Patient participation: Yes- Able to Participate  Level of consciousness: awake and awake and alert  Post-procedure vital signs: reviewed and stable  Pain management: adequate  Airway patency: patent  PONV status at discharge: No PONV  Anesthetic complications: no      Cardiovascular status: blood pressure returned to baseline  Respiratory status: unassisted and spontaneous ventilation  Hydration status: euvolemic  Follow-up not needed.        Visit Vitals  /78   Pulse 74   Temp 36.5 °C (97.7 °F) (Axillary)   Resp 16   Ht 5' 3" (1.6 m)   Wt 49 kg (108 lb)   SpO2 100%   Breastfeeding? No   BMI 19.13 kg/m²       Pain/Christina Score: Presence of Pain: denies (10/18/2018 11:07 AM)  Christina Score: 10 (10/18/2018  2:40 PM)        "

## 2018-10-18 NOTE — DISCHARGE INSTRUCTIONS
Nasal Surgery: Your Recovery  Youve just had nasal surgery. During the first weeks after surgery, be sure to follow the advice of your doctor. The tips on this sheet can help speed your recovery.  Tips for healing  · Dont take medicines containing aspirin or ibuprofen.  · Don't bump your nose or touch the splint or packing.  · Don't bend or lift.  · Sneeze or cough with your mouth open to reduce pressure inside your nose.  · Keep from blowing your nose until youre told its OK to do so.  · Keep eyeglasses from resting on your nose by taping them above the nose.  · Protect your nose from the sun.  · After packing is removed, start saltwater rinses if prescribed.  · Keep follow-up appointments with your doctor.  Follow-up visits  Your doctor will most likely want to see you within a week to check your healing. Any packing, splint, or dressings will probably be removed at that time. You may feel slight discomfort and bleed a little when this is done.  Assessing the results  During later follow-up visits, your doctor will assess your healing and the results of your surgery. Talk with your doctor about any problems or concerns you may have.  When to call the doctor  Call the doctor if you notice any of the following:  · Sudden increase in pain, swelling, or bruising  · Fever  · Heavy bleeding  · Yellow or greenish drainage from the nose  · Unrelieved headache  · Decreased or double vision  · Stiff neck or very tired feeling   Date Last Reviewed: 11/1/2016 © 2000-2017 [employer]. [address] [place] [address], [place] [address]. All rights reserved. This information is not intended as a substitute for professional medical care. Always follow your healthcare professional's instructions.        Nasal Surgery: Septoplasty    Youre scheduled to have nasal surgery. The type of nasal surgery youre having is called septoplasty. Read on to learn more about what to expect during this surgery.During surgery, the  surgeon may remove cartilage and bone to reshape the deviated septum. After surgery, there is more breathing space. Enough cartilage and bone remain to give the nose support.  What to expect during septoplasty  This surgery repairs a blockage inside the nose caused by a deviated septum. With a deviated septum, there is a problem with the wall that divides the nose into two chambers. A deviated septum may block air coming through one or both nostrils. This makes it harder for you to breathe through your nose. During septoplasty, the surgeon makes incisions inside the nose. Then the surgeon trims, reshapes, moves, or removes cartilage and sometimes bone from the septum.  Risks and possible complications  As with any surgery, nasal surgery has some risks. These include a slight risk of bleeding and infection. Your doctor will discuss any other risks and complications with you.   After septoplasty  After septoplasty, youll be taken to a recovery area or to your hospital room. Your experience may be as follows:  · You may have packing material inside your nose. This reduces bleeding and promotes healing. You may also have bandages (dressings) on the outside of your nose.  · Its normal to have some mucus and blood drain from your nose. Until packing is removed, you may have to breathe through your mouth.  · You may have some swelling or bruising around the eyelids if a rhinoplasty was also done.  · Expect some throat dryness and irritation.  · Pain medicine will be prescribed as needed. Dont take medicine that contains aspirin or ibuprofen. These can cause increased bleeding.  Follow-up care  Youll need to follow up with your doctor within a week after your surgery. Here is what to expect:  · Any packing, splint, or dressings will probably be removed. You may feel slight discomfort and bleed a little when this is done.  · After the splint or packing is removed, youll most likely breathe better than you did before  surgery.  · You may have minor numbness, pain, swelling, and a little stiffness under the tip of the nose.  · In a few days, the inside of your nose may swell and briefly block your breathing. Or a scab or crust may block breathing for a short time. Leave the scab alone. Your doctor can remove it. Using saline (irrigation or aerosol) regularly after surgery helps to reduce the amount of crusting at each visit.  · Contact your healthcare provider if you have any questions or concerns.  Date Last Reviewed: 11/1/2016  © 5063-5272 [employer]. [address] [place] [address]. All rights reserved. This information is not intended as a substitute for professional medical care. Always follow your healthcare professional's instructions.

## 2018-10-22 LAB — BACTERIA SPEC AEROBE CULT: NORMAL

## 2018-10-25 ENCOUNTER — HOSPITAL ENCOUNTER (OUTPATIENT)
Facility: HOSPITAL | Age: 38
Discharge: HOME | End: 2018-10-25
Attending: OTOLARYNGOLOGY | Admitting: OTOLARYNGOLOGY
Payer: COMMERCIAL

## 2018-10-25 ENCOUNTER — ANESTHESIA EVENT (OUTPATIENT)
Dept: SURGERY | Facility: HOSPITAL | Age: 38
End: 2018-10-25
Payer: COMMERCIAL

## 2018-10-25 ENCOUNTER — ANESTHESIA (OUTPATIENT)
Dept: SURGERY | Facility: HOSPITAL | Age: 38
End: 2018-10-25
Payer: COMMERCIAL

## 2018-10-25 VITALS
BODY MASS INDEX: 19.13 KG/M2 | OXYGEN SATURATION: 100 % | DIASTOLIC BLOOD PRESSURE: 81 MMHG | WEIGHT: 108 LBS | SYSTOLIC BLOOD PRESSURE: 130 MMHG | TEMPERATURE: 98 F | HEART RATE: 63 BPM | RESPIRATION RATE: 12 BRPM

## 2018-10-25 PROCEDURE — 36000706: Performed by: OTOLARYNGOLOGY

## 2018-10-25 PROCEDURE — 71000033 HC RECOVERY, INTIAL HOUR: Performed by: OTOLARYNGOLOGY

## 2018-10-25 PROCEDURE — 25000003 PHARM REV CODE 250: Performed by: OTOLARYNGOLOGY

## 2018-10-25 PROCEDURE — 25000003 PHARM REV CODE 250

## 2018-10-25 PROCEDURE — 25000003 PHARM REV CODE 250: Performed by: ANESTHESIOLOGY

## 2018-10-25 PROCEDURE — 25000242 PHARM REV CODE 250 ALT 637 W/ HCPCS: Performed by: OTOLARYNGOLOGY

## 2018-10-25 PROCEDURE — D9220A PRA ANESTHESIA: Mod: ANES,,, | Performed by: ANESTHESIOLOGY

## 2018-10-25 PROCEDURE — 63600175 PHARM REV CODE 636 W HCPCS: Performed by: ANESTHESIOLOGY

## 2018-10-25 PROCEDURE — S0028 INJECTION, FAMOTIDINE, 20 MG: HCPCS

## 2018-10-25 PROCEDURE — 63600175 PHARM REV CODE 636 W HCPCS: Performed by: NURSE ANESTHETIST, CERTIFIED REGISTERED

## 2018-10-25 PROCEDURE — 36000707: Performed by: OTOLARYNGOLOGY

## 2018-10-25 PROCEDURE — 37000009 HC ANESTHESIA EA ADD 15 MINS: Performed by: OTOLARYNGOLOGY

## 2018-10-25 PROCEDURE — 37000008 HC ANESTHESIA 1ST 15 MINUTES: Performed by: OTOLARYNGOLOGY

## 2018-10-25 PROCEDURE — D9220A PRA ANESTHESIA: Mod: CRNA,,, | Performed by: NURSE ANESTHETIST, CERTIFIED REGISTERED

## 2018-10-25 PROCEDURE — 71000015 HC POSTOP RECOV 1ST HR: Performed by: OTOLARYNGOLOGY

## 2018-10-25 RX ORDER — FAMOTIDINE 10 MG/ML
20 INJECTION INTRAVENOUS ONCE
Status: COMPLETED | OUTPATIENT
Start: 2018-10-25 | End: 2018-10-25

## 2018-10-25 RX ORDER — PROPOFOL 10 MG/ML
VIAL (ML) INTRAVENOUS
Status: DISCONTINUED | OUTPATIENT
Start: 2018-10-25 | End: 2018-10-25

## 2018-10-25 RX ORDER — PROMETHAZINE HYDROCHLORIDE 25 MG/1
25 TABLET ORAL EVERY 4 HOURS
COMMUNITY
End: 2019-06-06 | Stop reason: ALTCHOICE

## 2018-10-25 RX ORDER — SODIUM CHLORIDE, SODIUM LACTATE, POTASSIUM CHLORIDE, CALCIUM CHLORIDE 600; 310; 30; 20 MG/100ML; MG/100ML; MG/100ML; MG/100ML
125 INJECTION, SOLUTION INTRAVENOUS CONTINUOUS
Status: DISCONTINUED | OUTPATIENT
Start: 2018-10-25 | End: 2018-10-25 | Stop reason: HOSPADM

## 2018-10-25 RX ORDER — CEPHALEXIN 500 MG/1
500 CAPSULE ORAL EVERY 6 HOURS
COMMUNITY
End: 2019-06-06 | Stop reason: ALTCHOICE

## 2018-10-25 RX ORDER — FLUCONAZOLE 100 MG/1
TABLET ORAL
Status: DISCONTINUED | OUTPATIENT
Start: 2018-10-25 | End: 2018-10-25 | Stop reason: HOSPADM

## 2018-10-25 RX ORDER — DEXAMETHASONE SODIUM PHOSPHATE 4 MG/ML
INJECTION, SOLUTION INTRA-ARTICULAR; INTRALESIONAL; INTRAMUSCULAR; INTRAVENOUS; SOFT TISSUE
Status: DISCONTINUED | OUTPATIENT
Start: 2018-10-25 | End: 2018-10-25

## 2018-10-25 RX ORDER — LIDOCAINE HYDROCHLORIDE 10 MG/ML
1 INJECTION, SOLUTION EPIDURAL; INFILTRATION; INTRACAUDAL; PERINEURAL ONCE
Status: DISCONTINUED | OUTPATIENT
Start: 2018-10-25 | End: 2018-10-25 | Stop reason: HOSPADM

## 2018-10-25 RX ORDER — BUDESONIDE 0.25 MG/2ML
INHALANT ORAL
Status: DISCONTINUED
Start: 2018-10-25 | End: 2018-10-25 | Stop reason: HOSPADM

## 2018-10-25 RX ORDER — HYDROCODONE BITARTRATE AND ACETAMINOPHEN 10; 325 MG/1; MG/1
1 TABLET ORAL
COMMUNITY
End: 2019-06-06 | Stop reason: ALTCHOICE

## 2018-10-25 RX ORDER — BUDESONIDE 0.25 MG/2ML
INHALANT ORAL
Status: DISCONTINUED | OUTPATIENT
Start: 2018-10-25 | End: 2018-10-25 | Stop reason: HOSPADM

## 2018-10-25 RX ORDER — SODIUM CHLORIDE, SODIUM LACTATE, POTASSIUM CHLORIDE, CALCIUM CHLORIDE 600; 310; 30; 20 MG/100ML; MG/100ML; MG/100ML; MG/100ML
INJECTION, SOLUTION INTRAVENOUS CONTINUOUS
Status: DISCONTINUED | OUTPATIENT
Start: 2018-10-25 | End: 2018-10-25 | Stop reason: HOSPADM

## 2018-10-25 RX ORDER — CEFAZOLIN SODIUM 1 G/3ML
INJECTION, POWDER, FOR SOLUTION INTRAMUSCULAR; INTRAVENOUS
Status: DISCONTINUED | OUTPATIENT
Start: 2018-10-25 | End: 2018-10-25

## 2018-10-25 RX ORDER — FAMOTIDINE 10 MG/ML
INJECTION INTRAVENOUS
Status: COMPLETED
Start: 2018-10-25 | End: 2018-10-25

## 2018-10-25 RX ORDER — LIDOCAINE HYDROCHLORIDE AND EPINEPHRINE 10; 10 MG/ML; UG/ML
INJECTION, SOLUTION INFILTRATION; PERINEURAL
Status: DISCONTINUED | OUTPATIENT
Start: 2018-10-25 | End: 2018-10-25 | Stop reason: HOSPADM

## 2018-10-25 RX ORDER — OXYMETAZOLINE HCL 0.05 %
SPRAY, NON-AEROSOL (ML) NASAL
Status: DISCONTINUED | OUTPATIENT
Start: 2018-10-25 | End: 2018-10-25 | Stop reason: HOSPADM

## 2018-10-25 RX ORDER — MORPHINE SULFATE 2 MG/ML
2 INJECTION, SOLUTION INTRAMUSCULAR; INTRAVENOUS EVERY 5 MIN PRN
Status: DISCONTINUED | OUTPATIENT
Start: 2018-10-25 | End: 2018-10-25 | Stop reason: HOSPADM

## 2018-10-25 RX ORDER — SUCCINYLCHOLINE CHLORIDE 20 MG/ML
INJECTION INTRAMUSCULAR; INTRAVENOUS
Status: DISCONTINUED | OUTPATIENT
Start: 2018-10-25 | End: 2018-10-25

## 2018-10-25 RX ORDER — ONDANSETRON 2 MG/ML
INJECTION INTRAMUSCULAR; INTRAVENOUS
Status: DISCONTINUED
Start: 2018-10-25 | End: 2018-10-25 | Stop reason: HOSPADM

## 2018-10-25 RX ORDER — ONDANSETRON 2 MG/ML
4 INJECTION INTRAMUSCULAR; INTRAVENOUS DAILY PRN
Status: DISCONTINUED | OUTPATIENT
Start: 2018-10-25 | End: 2018-10-25 | Stop reason: HOSPADM

## 2018-10-25 RX ADMIN — SODIUM CHLORIDE, POTASSIUM CHLORIDE, SODIUM LACTATE AND CALCIUM CHLORIDE: 600; 310; 30; 20 INJECTION, SOLUTION INTRAVENOUS at 09:10

## 2018-10-25 RX ADMIN — ONDANSETRON HYDROCHLORIDE 4 MG: 2 INJECTION, SOLUTION INTRAMUSCULAR; INTRAVENOUS at 10:10

## 2018-10-25 RX ADMIN — FAMOTIDINE 20 MG: 10 INJECTION, SOLUTION INTRAVENOUS at 08:10

## 2018-10-25 RX ADMIN — PROPOFOL 200 MG: 10 INJECTION, EMULSION INTRAVENOUS at 09:10

## 2018-10-25 RX ADMIN — CEFAZOLIN 1 G: 330 INJECTION, POWDER, FOR SOLUTION INTRAMUSCULAR; INTRAVENOUS at 09:10

## 2018-10-25 RX ADMIN — DEXAMETHASONE SODIUM PHOSPHATE 16 MG: 4 INJECTION, SOLUTION INTRAMUSCULAR; INTRAVENOUS at 09:10

## 2018-10-25 RX ADMIN — SUCCINYLCHOLINE CHLORIDE 100 MG: 20 INJECTION, SOLUTION INTRAMUSCULAR; INTRAVENOUS at 09:10

## 2018-10-25 RX ADMIN — FAMOTIDINE 20 MG: 10 INJECTION INTRAVENOUS at 08:10

## 2018-10-25 NOTE — DISCHARGE SUMMARY
[facility] - Periop Services    Discharge Note        SUMMARY     Admit Date: 10/25/2018    Attending Physician: Torin Carver MD     Discharge Physician: Torin Carver MD    Discharge Date: 10/25/2018 9:59 AM      Hospital Course: Patient tolerated procedure well.     Disposition: Home or Self Care    Patient Instructions:   Current Discharge Medication List      CONTINUE these medications which have NOT CHANGED    Details   cephALEXin (KEFLEX) 500 MG capsule Take 500 mg by mouth every 6 (six) hours.      HYDROcodone-acetaminophen (NORCO)  mg per tablet Take 1 tablet by mouth.      promethazine (PHENERGAN) 25 MG tablet Take 25 mg by mouth every 4 (four) hours.             Discharge Procedure Orders (must include Diet, Follow-up, Activity):  No discharge procedures on file.     Follow Up:  Follow up as scheduled.  Resume routine diet.  Activity as tolerated.

## 2018-10-25 NOTE — ANESTHESIA PREPROCEDURE EVALUATION
10/25/2018  Lexy Guerra is a 38 y.o., female.    Anesthesia Evaluation    I have reviewed the Patient Summary Reports.    I have reviewed the Nursing Notes.   I have reviewed the Medications.     Review of Systems  Anesthesia Hx:  No problems with previous Anesthesia        Physical Exam  General:  Well nourished    Airway/Jaw/Neck:  Airway Findings: Mouth Opening: Normal Tongue: Normal  General Airway Assessment: Adult  Mallampati: I  Jaw/Neck Findings:  Neck ROM: Normal ROM       Chest/Lungs:  Chest/Lungs Findings: Clear to auscultation     Heart/Vascular:  Heart Findings: Rate: Normal        Mental Status:  Mental Status Findings:  Cooperative, Alert and Oriented         Anesthesia Plan  Type of Anesthesia, risks & benefits discussed:  Anesthesia Type:  general  Patient's Preference:   Intra-op Monitoring Plan: standard ASA monitors  Intra-op Monitoring Plan Comments:   Post Op Pain Control Plan: IV/PO Opioids PRN  Post Op Pain Control Plan Comments:   Induction:   IV  Beta Blocker:  Patient is not currently on a Beta-Blocker (No further documentation required).       Informed Consent: Patient understands risks and agrees with Anesthesia plan.  Questions answered. Anesthesia consent signed with patient.  ASA Score: 1     Day of Surgery Review of History & Physical: I have interviewed and examined the patient. I have reviewed the patient's H&P dated:            Ready For Surgery From Anesthesia Perspective.

## 2018-10-25 NOTE — DISCHARGE INSTRUCTIONS

## 2018-10-25 NOTE — ANESTHESIA POSTPROCEDURE EVALUATION
Anesthesia Post Evaluation    Patient: Lexy Guerra    Procedure(s) Performed: Procedure(s) (LRB):  IRRIGATION AND DEBRIDEMENT (Bilateral)    Final Anesthesia Type: general  Patient location during evaluation: PACU  Patient participation: Yes- Able to Participate  Level of consciousness: awake and alert  Post-procedure vital signs: reviewed and stable  Pain management: adequate  Airway patency: patent  PONV status at discharge: No PONV  Anesthetic complications: no      Cardiovascular status: blood pressure returned to baseline  Respiratory status: unassisted  Hydration status: euvolemic  Follow-up not needed.        Visit Vitals  /81   Pulse 63   Temp 36.6 °C (97.9 °F) (Oral)   Resp 12   Wt 49 kg (108 lb)   SpO2 100%   Breastfeeding? No   BMI 19.13 kg/m²       Pain/Christina Score: Presence of Pain: denies (10/25/2018  7:54 AM)  Christina Score: 10 (10/25/2018 10:55 AM)

## 2018-10-25 NOTE — PLAN OF CARE
Negative pregnancy test on 10/17/18. Patient states  is sterile and she has not been sexually active since last test.

## 2018-10-25 NOTE — TRANSFER OF CARE
Anesthesia Transfer of Care Note    Patient: Lexy Guerra    Procedure(s) Performed: Procedure(s) (LRB):  IRRIGATION AND DEBRIDEMENT (Bilateral)    Patient location: PACU    Anesthesia Type: general    Transport from OR: Transported from OR on room air with adequate spontaneous ventilation    Post pain: adequate analgesia    Post assessment: no apparent anesthetic complications and tolerated procedure well    Post vital signs: stable    Level of consciousness: awake, alert and oriented    Nausea/Vomiting: no nausea/vomiting    Complications: none    Transfer of care protocol was followed      Last vitals:   Visit Vitals  Wt 49 kg (108 lb)   Breastfeeding? No   BMI 19.13 kg/m²

## 2018-11-01 NOTE — OP NOTE
DATE OF PROCEDURE:  10/25/2018.    PREOPERATIVE DIAGNOSIS:  Chronic sinusitis.    POSTOPERATIVE DIAGNOSIS:  Chronic sinusitis.    PROCEDURES PERFORMED:  Bilateral endoscopy with debridement and lavage.    ANESTHESIA:  General endotracheal.    SURGEON:  Dr. Carver.    PROCEDURE IN DETAILS:  The patient was taken to the operating room and  placed in a supine position. After satisfactory general anesthetic had been  obtained, the procedure was begun. The patient was prepped and draped in  standard fashion for nasal endoscopic procedure. Four pledgets of 4 mL of  4% cocaine were placed into the patients nose. This was removed. The  patients nose was injected with lidocaine 1% with epinephrine 1:100,000;  approximately 5 mL was used.    Under endoscopic vision, both nasal airways were cleaned and suctioned free  of debris and old blood. The maxillary antrostomies were found and  suctioned open, and old blood from the maxillary sinus was suctioned. The  ethmoid sinuses bilaterally were then viewed and all debris removed from  the ethmoid sinuses. The frontal ostium was then observed to be patent. The  sphenoid ostia bilaterally were then observed and cleaned of old blood,  scabs. Nose was then viewed. The ethmoid sinuses appeared to be clear  bilaterally. The maxillary sinuses bilaterally appeared to be open and  clear of any old blood. The inferior turbinates were laid laterally. The  medial turbinates were fractured medially. The sphenoid sinus was open and  free of debris. The sinus cavities were then lavaged free of any bleeding  and scabbing and each suctioned thoroughly. They were then viewed again and  found to be widely open and draining into the nose. The nose was filled  with Cortisporin antibiotic ointment. The patient was awakened and taken to  the recovery room in stable condition.        ANUSHA/KAT dd: 11/01/2018 07:52:10 (CDT)   td: 11/01/2018 08:14:49 (CDT)  Doc ID #[id number]   Job ID #[id number]    CC:

## 2018-11-01 NOTE — BRIEF OP NOTE
[facility] - Periop Services  Brief Operative Note     SUMMARY     Surgery Date: 10/25/2018     Surgeon(s) and Role:     * Torin Carver MD - Primary        Pre-op Diagnosis:  Chronic pansinusitis [J32.4]    Post-op Diagnosis:  Post-Op Diagnosis Codes:     * Chronic pansinusitis [J32.4]    Procedure(s) (LRB):  IRRIGATION AND DEBRIDEMENT (Bilateral)  ENDOSCOPY, NOSE (Bilateral)      Description of the findings of the procedure:  Chronic pansinusitis [J32.4]      Estimated Blood Loss: * No values recorded between 10/25/2018  9:23 AM and 10/25/2018  9:53 AM *

## 2018-11-16 LAB — FUNGUS SPEC CULT: NORMAL

## 2019-05-09 ENCOUNTER — OFFICE VISIT (OUTPATIENT)
Dept: DERMATOLOGY | Facility: CLINIC | Age: 39
End: 2019-05-09
Payer: COMMERCIAL

## 2019-05-09 VITALS — BODY MASS INDEX: 19.14 KG/M2 | HEIGHT: 63 IN | WEIGHT: 108 LBS

## 2019-05-09 DIAGNOSIS — D48.5 NEOPLASM OF UNCERTAIN BEHAVIOR OF SKIN: ICD-10-CM

## 2019-05-09 DIAGNOSIS — L81.4 LENTIGINES: ICD-10-CM

## 2019-05-09 DIAGNOSIS — L73.8 SEBACEOUS HYPERPLASIA: ICD-10-CM

## 2019-05-09 DIAGNOSIS — L82.1 SEBORRHEIC KERATOSES: ICD-10-CM

## 2019-05-09 DIAGNOSIS — D22.9 MULTIPLE BENIGN NEVI: Primary | ICD-10-CM

## 2019-05-09 PROCEDURE — 3008F PR BODY MASS INDEX (BMI) DOCUMENTED: ICD-10-PCS | Mod: CPTII,S$GLB,, | Performed by: DERMATOLOGY

## 2019-05-09 PROCEDURE — 3008F BODY MASS INDEX DOCD: CPT | Mod: CPTII,S$GLB,, | Performed by: DERMATOLOGY

## 2019-05-09 PROCEDURE — 99202 PR OFFICE/OUTPT VISIT, NEW, LEVL II, 15-29 MIN: ICD-10-PCS | Mod: S$GLB,,, | Performed by: DERMATOLOGY

## 2019-05-09 PROCEDURE — 99999 PR PBB SHADOW E&M-EST. PATIENT-LVL III: ICD-10-PCS | Mod: PBBFAC,,, | Performed by: DERMATOLOGY

## 2019-05-09 PROCEDURE — 99999 PR PBB SHADOW E&M-EST. PATIENT-LVL III: CPT | Mod: PBBFAC,,, | Performed by: DERMATOLOGY

## 2019-05-09 PROCEDURE — 99202 OFFICE O/P NEW SF 15 MIN: CPT | Mod: S$GLB,,, | Performed by: DERMATOLOGY

## 2019-05-09 NOTE — PROGRESS NOTES
Subjective:       Patient ID:  Lexy Guerra is a 39 y.o. female who presents for   Chief Complaint   Patient presents with    Lesion     Last OV with  2015   40 y/o F presents for initial visit with her  for evaluation of a few lesions. Pt has lesion on face x few years and complains of it growing in size and bleeding. Dr.Erickson meng'poonam in the past, never fully resolved    No h/o skin cancer. Denies family h/o melanoma.     Review of Systems   Constitutional: Negative for fever, chills and fatigue.   Skin: Positive for daily sunscreen use, activity-related sunscreen use and wears hat. Negative for itching, rash and dry skin.        Objective:    Physical Exam   Constitutional: She appears well-developed and well-nourished.   HENT:   Mouth/Throat: Lips normal.    Eyes: Lids are normal.    Neurological: She is alert and oriented to person, place, and time.   Psychiatric: She has a normal mood and affect.   Skin:   Areas Examined (abnormalities noted in diagram):   Head / Face Inspection Performed  Neck Inspection Performed  Back Inspection Performed                       Diagram Legend     Erythematous scaling macule/papule c/w actinic keratosis       Vascular papule c/w angioma      Pigmented verrucoid papule/plaque c/w seborrheic keratosis      Yellow umbilicated papule c/w sebaceous hyperplasia      Irregularly shaped tan macule c/w lentigo     1-2 mm smooth white papules consistent with Milia      Movable subcutaneous cyst with punctum c/w epidermal inclusion cyst      Subcutaneous movable cyst c/w pilar cyst      Firm pink to brown papule c/w dermatofibroma      Pedunculated fleshy papule(s) c/w skin tag(s)      Evenly pigmented macule c/w junctional nevus     Mildly variegated pigmented, slightly irregular-bordered macule c/w mildly atypical nevus      Flesh colored to evenly pigmented papule c/w intradermal nevus       Pink pearly papule/plaque c/w basal cell carcinoma       Erythematous hyperkeratotic cursted plaque c/w SCC      Surgical scar with no sign of skin cancer recurrence      Open and closed comedones      Inflammatory papules and pustules      Verrucoid papule consistent consistent with wart     Erythematous eczematous patches and plaques     Dystrophic onycholytic nail with subungual debris c/w onychomycosis     Umbilicated papule    Erythematous-base heme-crusted tan verrucoid plaque consistent with inflamed seborrheic keratosis     Erythematous Silvery Scaling Plaque c/w Psoriasis     See annotation      Assessment / Plan:        Multiple benign nevi  Reassurance that her nevi appear benign with regular and consistent pigment pattern on dermoscopy    Seborrheic keratoses  These are benign inherited growths without a malignant potential. Reassurance given to patient. No treatment is necessary.     Lentigines  These are benign hyperpigmented sun induced lesions. Daily sun protection will reduce the number of new lesions    Sebaceous hyperplasia  This is a common condition representing benign enlargement of the sebaceous lobule. It typically occurs in adulthood. Reassurance given to patient.     Neoplasm of uncertain behavior of skin  Plan shave biopsy when pt returns during her vacation            Follow up for as scheduled.

## 2019-06-06 ENCOUNTER — PROCEDURE VISIT (OUTPATIENT)
Dept: DERMATOLOGY | Facility: CLINIC | Age: 39
End: 2019-06-06
Payer: COMMERCIAL

## 2019-06-06 VITALS — BODY MASS INDEX: 19.14 KG/M2 | WEIGHT: 108 LBS | HEIGHT: 63 IN | RESPIRATION RATE: 16 BRPM

## 2019-06-06 DIAGNOSIS — D48.5 NEOPLASM OF UNCERTAIN BEHAVIOR OF SKIN: Primary | ICD-10-CM

## 2019-06-06 PROCEDURE — 88305 TISSUE SPECIMEN TO PATHOLOGY, DERMATOLOGY: ICD-10-PCS | Mod: 26,,, | Performed by: PATHOLOGY

## 2019-06-06 PROCEDURE — 88305 TISSUE EXAM BY PATHOLOGIST: CPT | Mod: 26,,, | Performed by: PATHOLOGY

## 2019-06-06 PROCEDURE — 99499 UNLISTED E&M SERVICE: CPT | Mod: S$GLB,,, | Performed by: DERMATOLOGY

## 2019-06-06 PROCEDURE — 11102 PR TANGENTIAL BIOPSY, SKIN, SINGLE LESION: ICD-10-PCS | Mod: S$GLB,,, | Performed by: DERMATOLOGY

## 2019-06-06 PROCEDURE — 99499 NO LOS: ICD-10-PCS | Mod: S$GLB,,, | Performed by: DERMATOLOGY

## 2019-06-06 PROCEDURE — 11102 TANGNTL BX SKIN SINGLE LES: CPT | Mod: S$GLB,,, | Performed by: DERMATOLOGY

## 2019-06-06 PROCEDURE — 88305 TISSUE EXAM BY PATHOLOGIST: CPT | Performed by: PATHOLOGY

## 2019-06-06 NOTE — PROGRESS NOTES
Subjective:       Patient ID:  Lexy Guerra is a 39 y.o. female who presents for   Chief Complaint   Patient presents with    Follow-up    Lesion     Right cheek      Last seen 5-9-19   Lesion right cheek  - Has decreased in size recently but still bleeds when she washes her face     No h/o skin cancer. Denies family h/o melanoma.            History reviewed. No pertinent past medical history.    Review of Systems   Constitutional: Negative for fever, chills and fatigue.   Skin: Positive for daily sunscreen use, activity-related sunscreen use and wears hat. Negative for itching, rash and dry skin.   Hematologic/Lymphatic: Does not bruise/bleed easily.        Objective:    Physical Exam   Constitutional: She appears well-developed and well-nourished.   HENT:   Mouth/Throat: Lips normal.    Eyes: Lids are normal.    Neurological: She is alert and oriented to person, place, and time.   Psychiatric: She has a normal mood and affect.   Skin:   Areas Examined (abnormalities noted in diagram):   Head / Face Inspection Performed  Neck Inspection Performed         Photo from last OV         Diagram Legend     Erythematous scaling macule/papule c/w actinic keratosis       Vascular papule c/w angioma      Pigmented verrucoid papule/plaque c/w seborrheic keratosis      Yellow umbilicated papule c/w sebaceous hyperplasia      Irregularly shaped tan macule c/w lentigo     1-2 mm smooth white papules consistent with Milia      Movable subcutaneous cyst with punctum c/w epidermal inclusion cyst      Subcutaneous movable cyst c/w pilar cyst      Firm pink to brown papule c/w dermatofibroma      Pedunculated fleshy papule(s) c/w skin tag(s)      Evenly pigmented macule c/w junctional nevus     Mildly variegated pigmented, slightly irregular-bordered macule c/w mildly atypical nevus      Flesh colored to evenly pigmented papule c/w intradermal nevus       Pink pearly papule/plaque c/w basal cell carcinoma       Erythematous hyperkeratotic cursted plaque c/w SCC      Surgical scar with no sign of skin cancer recurrence      Open and closed comedones      Inflammatory papules and pustules      Verrucoid papule consistent consistent with wart     Erythematous eczematous patches and plaques     Dystrophic onycholytic nail with subungual debris c/w onychomycosis     Umbilicated papule    Erythematous-base heme-crusted tan verrucoid plaque consistent with inflamed seborrheic keratosis     Erythematous Silvery Scaling Plaque c/w Psoriasis     See annotation      Assessment / Plan:      Pathology Orders:     Normal Orders This Visit    Tissue Specimen To Pathology, Dermatology     Questions:    Directional Terms:  Other(comment)    Clinical Information:  r/o inflammatory papule vs NMSC vs other    Specific Site:  R cheek        Neoplasm of uncertain behavior of skin  -     Tissue Specimen To Pathology, Dermatology    Shave biopsy procedure note:    Shave biopsy performed after verbal consent including risk of infection, scar, recurrence, need for additional treatment of site. Area prepped with alcohol, anesthetized with approximately 1.0cc of 1% lidocaine with epinephrine. Lesional tissue shaved with razor blade. Hemostasis achieved with application of aluminum chloride followed by hyfrecation. No complications. Dressing applied. Wound care explained.    If +, Mohs         Follow up for pending Pathology.

## 2019-06-13 ENCOUNTER — PATIENT MESSAGE (OUTPATIENT)
Dept: DERMATOLOGY | Facility: CLINIC | Age: 39
End: 2019-06-13

## 2022-06-03 ENCOUNTER — OCCUPATIONAL HEALTH (OUTPATIENT)
Dept: URGENT CARE | Facility: CLINIC | Age: 42
End: 2022-06-03

## 2022-06-03 PROCEDURE — 80305 DRUG TEST PRSMV DIR OPT OBS: CPT | Mod: S$GLB,,, | Performed by: EMERGENCY MEDICINE

## 2022-06-03 PROCEDURE — 80305 PR COLLECTION ONLY DRUG SCREEN: ICD-10-PCS | Mod: S$GLB,,, | Performed by: EMERGENCY MEDICINE

## (undated) DEVICE — DEVICE INFLATION SE SINUS BLLN

## (undated) DEVICE — BLLN SYS SIMUPLASTY 6MM

## (undated) DEVICE — SPONGE PATTY SURGICAL .5X3IN

## (undated) DEVICE — BLADE SURG #15 CARBON STEEL

## (undated) DEVICE — PACK NASAL SINUS

## (undated) DEVICE — SLEEVE SCD EXPRESS CALF MEDIUM

## (undated) DEVICE — SYR BLOOD COLLECT 3ML 21G 1IN

## (undated) DEVICE — SUT SILK 2.0 BLK 18

## (undated) DEVICE — SYR B-D DISP CONTROL 10CC100/C

## (undated) DEVICE — CULTSWAB+ AMIES W/O CHARC SNG

## (undated) DEVICE — SYR 30CC LUER LOCK

## (undated) DEVICE — CANISTER SUCTION 3000CC

## (undated) DEVICE — SOL .9NACL PF 100 ML

## (undated) DEVICE — Device

## (undated) DEVICE — GLOVE SURG ULTRA TOUCH 8

## (undated) DEVICE — NDL SPINAL 25GX3.5 SPINOCAN

## (undated) DEVICE — SET DECANTER MEDICHOICE

## (undated) DEVICE — SOL NACL IRR 1000ML BTL